# Patient Record
Sex: MALE | HISPANIC OR LATINO
[De-identification: names, ages, dates, MRNs, and addresses within clinical notes are randomized per-mention and may not be internally consistent; named-entity substitution may affect disease eponyms.]

---

## 2021-09-17 PROBLEM — Z00.00 ENCOUNTER FOR PREVENTIVE HEALTH EXAMINATION: Status: ACTIVE | Noted: 2021-09-17

## 2021-11-11 ENCOUNTER — APPOINTMENT (OUTPATIENT)
Dept: OTOLARYNGOLOGY | Facility: CLINIC | Age: 46
End: 2021-11-11

## 2021-11-11 ENCOUNTER — APPOINTMENT (OUTPATIENT)
Dept: OTOLARYNGOLOGY | Facility: CLINIC | Age: 46
End: 2021-11-11
Payer: COMMERCIAL

## 2021-11-11 VITALS
DIASTOLIC BLOOD PRESSURE: 105 MMHG | TEMPERATURE: 98.6 F | HEART RATE: 75 BPM | HEIGHT: 65 IN | BODY MASS INDEX: 29.32 KG/M2 | WEIGHT: 176 LBS | SYSTOLIC BLOOD PRESSURE: 170 MMHG

## 2021-11-11 DIAGNOSIS — M26.609 UNSPECIFIED TEMPOROMANDIBULAR JOINT DISORDER: ICD-10-CM

## 2021-11-11 DIAGNOSIS — Z86.79 PERSONAL HISTORY OF OTHER DISEASES OF THE CIRCULATORY SYSTEM: ICD-10-CM

## 2021-11-11 DIAGNOSIS — Z78.9 OTHER SPECIFIED HEALTH STATUS: ICD-10-CM

## 2021-11-11 DIAGNOSIS — Z83.3 FAMILY HISTORY OF DIABETES MELLITUS: ICD-10-CM

## 2021-11-11 DIAGNOSIS — Z86.39 PERSONAL HISTORY OF OTHER ENDOCRINE, NUTRITIONAL AND METABOLIC DISEASE: ICD-10-CM

## 2021-11-11 DIAGNOSIS — Z87.2 PERSONAL HISTORY OF DISEASES OF THE SKIN AND SUBCUTANEOUS TISSUE: ICD-10-CM

## 2021-11-11 DIAGNOSIS — Z82.49 FAMILY HISTORY OF ISCHEMIC HEART DISEASE AND OTHER DISEASES OF THE CIRCULATORY SYSTEM: ICD-10-CM

## 2021-11-11 PROCEDURE — 92550 TYMPANOMETRY & REFLEX THRESH: CPT

## 2021-11-11 PROCEDURE — 92557 COMPREHENSIVE HEARING TEST: CPT

## 2021-11-11 PROCEDURE — 99204 OFFICE O/P NEW MOD 45 MIN: CPT

## 2021-11-11 RX ORDER — LISINOPRIL 20 MG/1
20 TABLET ORAL
Refills: 0 | Status: ACTIVE | COMMUNITY

## 2021-11-27 ENCOUNTER — TRANSCRIPTION ENCOUNTER (OUTPATIENT)
Age: 46
End: 2021-11-27

## 2021-12-01 ENCOUNTER — NON-APPOINTMENT (OUTPATIENT)
Age: 46
End: 2021-12-01

## 2021-12-01 PROBLEM — M26.609 TEMPOROMANDIBULAR JOINT DISORDER: Status: ACTIVE | Noted: 2021-12-01

## 2021-12-01 RX ORDER — METFORMIN HYDROCHLORIDE 500 MG/1
500 TABLET, COATED ORAL DAILY
Refills: 0 | Status: ACTIVE | COMMUNITY

## 2021-12-01 NOTE — HISTORY OF PRESENT ILLNESS
[de-identified] : Mr. DORMAN is a 46 year old man who was referred by Dr. Mendez for left tinnitus.\par He was accompanied by his girlfriend who contributed to history and served as .\par PMH:  DM, HTN, HLD\par PCP:  Dr. Miky Falcon\par \par He reports 2-year hx of nonpulsatile chronic intermittent tinnitus (like crickets) on left side only, along with decreased hearing, that started after exposed to very loud machinery at work.  No imbalance, ear pain or ear infections.\par No change in sx over time.\par He reports that audiogram done 4-5 months ago was "normal".\par No head trauma.  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

## 2021-12-01 NOTE — CONSULT LETTER
[Dear  ___] : Dear  [unfilled], [( Thank you for referring [unfilled] for consultation for _____ )] : Thank you for referring [unfilled] for consultation for [unfilled] [Please see my note below.] : Please see my note below. [Consult Closing:] : Thank you very much for allowing me to participate in the care of this patient.  If you have any questions, please do not hesitate to contact me. [Sincerely,] : Sincerely, [FreeTextEntry2] : Elsa Mendez MD\par [address]\[place] [address] \par   [FreeTextEntry3] : \par Lili Turner MD \par Otolaryngology, Head and Neck Surgery \par \par   [___] : [unfilled]

## 2021-12-01 NOTE — DATA REVIEWED
[de-identified] : \par AUDIOGRAM (11/11/2021)\par BILATERAL:  Moderately severe rising to moderate mixed HL, w/ air bone gaps 5-55 dB \par Tympanograms  As  bilateral   \par Word recognition 100%  bilateral \par   ,DirectAddress_Unknown,DirectAddress_Unknown

## 2021-12-01 NOTE — ASSESSMENT
[FreeTextEntry1] : Mr. Severino has LEFT nonpulsatile tinnitus and L.>R HL for about 2 years, reportedly following loud noise exposure at work.\par \par Audiogram today shows moderately severe to moderate mixed HL bilateral , with air bone gaps up to 55 dB on left and up to 45 dB on right side.  Work recognition is 100%.  (Reportedly hearing test 4-5 months ago was normal.)\par I am suspicious for otosclerosis. \par Discussed options for  improvement of hearing include amplification and middle ear surgery.  He is leaning toward hearing aids.\par --> CT temporal bone noncontrast\par \par His left tinnitus may be from the mixed Hl but more likely is his bruxism, which affects left side more.\par --> use custom  nightly\par \par Return after CT scan

## 2021-12-16 ENCOUNTER — APPOINTMENT (OUTPATIENT)
Dept: OTOLARYNGOLOGY | Facility: CLINIC | Age: 46
End: 2021-12-16
Payer: COMMERCIAL

## 2021-12-16 DIAGNOSIS — H80.93 UNSPECIFIED OTOSCLEROSIS, BILATERAL: ICD-10-CM

## 2021-12-16 DIAGNOSIS — F45.8 OTHER SOMATOFORM DISORDERS: ICD-10-CM

## 2021-12-16 DIAGNOSIS — H93.12 TINNITUS, LEFT EAR: ICD-10-CM

## 2021-12-16 DIAGNOSIS — H90.6 MIXED CONDUCTIVE AND SENSORINEURAL HEARING LOSS, BILATERAL: ICD-10-CM

## 2021-12-16 PROCEDURE — 99442: CPT

## 2021-12-16 NOTE — HISTORY OF PRESENT ILLNESS
[de-identified] : TELEPHONIC VISIT\par  Visit initiated at patient request. This audio visit is occurring during the  state of emergency due to COVID-19. Governmental regulation is restricting travel, in-person contact, recommend use of remote activities and telemedicine whenever possible. I discussed with patient the limitations of telemedicine encounters, including risks associated with the technology platform, technical difficulties, data security, and no physical exam. The patient may need further testing and workup to arrive at a diagnosis and treatment plan. We discussed this will be billed as a visit. \par Mr. DORMAN  understood and elected to proceed at 10:15 AM on Dec 16, 2021 \par Patient location:Work  in [place], [place].  Patient was the only participant.\par Physician location:  Office of Dr. Turner at [address] [place] [address] in [place], [place]\par ----------------------------------------------------------------------------------------\par ---------------------------------------------------------------------------------------- \par \par Mr. DORMAN has bilateral mixed HL and left tinnitus.\par Communication was facilitated by language line  Gu[place]ian #[id number].\par PMH:  DM, HTN, HLD\par PCP:  Dr. Miky Falcon\par \par INITIAL VISIT 11/11/2021\par He reports 2-year hx of nonpulsatile chronic intermittent tinnitus (like crickets) on left side only, along with decreased hearing, that started after exposed to very loud machinery at work.  No imbalance, ear pain or ear infections.\par No change in sx over time.\par He reports that audiogram done 4-5 months ago was "normal".\par No head trauma.  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  No otosclerosis noted)) \par \par \par

## 2021-12-16 NOTE — CONSULT LETTER
[Dear  ___] : Dear  [unfilled], [Please see my note below.] : Please see my note below. [Consult Closing:] : Thank you very much for allowing me to participate in the care of this patient.  If you have any questions, please do not hesitate to contact me. [Sincerely,] : Sincerely, [___] : [unfilled] [Courtesy Letter:] : I had the pleasure of seeing your patient, [unfilled], in my office today. [FreeTextEntry2] : Miky Falcon, \par [address]\[place] [address] \par   [FreeTextEntry3] : \par Lili Turner MD \par Otolaryngology, Head and Neck Surgery \par \par

## 2021-12-16 NOTE — ASSESSMENT
[FreeTextEntry1] : Mr. Severino has LEFT nonpulsatile tinnitus and L.>R mixed HL for about 2 years, reportedly following loud noise exposure at work.\par No otosclerosis or abnormality was found on CT scan to explain the mixed HL.  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  Audiology department will contact him within a week regarding his hearing aid coverage and limitations (specific vendor, for example), then either schedule him for hearing aid evaluation or make a referral.\par \par His left tinnitus may be from the mixed Hl but may also be from bruxism, which affects left side more.\par --> see dentist for custom  \par \par

## 2021-12-16 NOTE — DATA REVIEWED
[de-identified] : \par AUDIOGRAM (11/11/2021)\par BILATERAL:  Moderately severe rising to moderate mixed HL, w/ air bone gaps 5-55 dB \par Tympanograms  As  bilateral   \par Word recognition 100%  bilateral \par

## 2023-06-03 ENCOUNTER — APPOINTMENT (OUTPATIENT)
Dept: UROLOGY | Facility: CLINIC | Age: 48
End: 2023-06-03
Payer: MEDICAID

## 2023-06-03 VITALS
OXYGEN SATURATION: 98 % | BODY MASS INDEX: 29.66 KG/M2 | WEIGHT: 178 LBS | TEMPERATURE: 98.4 F | HEART RATE: 96 BPM | HEIGHT: 65 IN | SYSTOLIC BLOOD PRESSURE: 157 MMHG | DIASTOLIC BLOOD PRESSURE: 83 MMHG

## 2023-06-03 LAB
BILIRUB UR QL STRIP: NORMAL
CLARITY UR: CLEAR
COLLECTION METHOD: NORMAL
GLUCOSE UR-MCNC: NORMAL
HCG UR QL: 0.2 EU/DL
HGB UR QL STRIP.AUTO: NORMAL
KETONES UR-MCNC: NORMAL
LEUKOCYTE ESTERASE UR QL STRIP: NORMAL
NITRITE UR QL STRIP: NORMAL
PH UR STRIP: 5.5
PROT UR STRIP-MCNC: NORMAL
SP GR UR STRIP: 1.02

## 2023-06-03 PROCEDURE — 99204 OFFICE O/P NEW MOD 45 MIN: CPT

## 2023-06-05 NOTE — HISTORY OF PRESENT ILLNESS
[FreeTextEntry1] : Language: English\par Date of First visit: 6/3/2023\par Accompanied by: Self\par Contact info: ***\par [facility]\par fax: [phone]\par \par \par CC/ Problem List:\par \par ===============================================================================\par FIRST VISIT:\par The patient is a 47 year male who first presents 06/03/2023 for ED. \par \par He is having some stress. He has been having problems intermittently but he thinks it started around January 2023. He states his erections don't last long enough but do get hard initially. He does get morning erections: he has morning erections almost every day. He does get masturbatory erections.\par \par He is able to sleep. He does not smoke. He denies h/o SCD, CP. He has DMII since 2022 and he states it is controlled. He denies h/o RP. He has never tried any meds for his erections. His erections are better when he rests well but when he is stressed he has some issues. He has no problems when he gets rest and has less stress.\par \par \par -------------------------------------------------------------------------------------------\par INTERVAL VISITS:\par \par \par ===============================================================================\par \par PMH: DMII\par PSH: Denies\par POBH: (if applicable)\par FH: \par \par ALL: NKDA\par MEDS: Metformin, Alogliptin, Vit C, B, Lisinopril\par SOC: Denies Tob, Social EtOH, No drugs\par \par \par ROS: Review of Systems is as per HPI unless otherwise denoted below\par \par \par ===============================================================================\par DATA: \par \par LABS:-------------------------------------------------------------------------------------------------------------------\par 6/3/2023: UA negative\par \par \par RADS:-------------------------------------------------------------------------------------------------------------------\par \par \par \par PATHOLOGY/CYTOLOGY:-------------------------------------------------------------------------------------------\par \par \par \par VOIDING STUDIES: ----------------------------------------------------------------------------------------------------\par 6/3/2023: PVR 3cc\par \par \par STONE STUDIES: (Analysis/LLSA)----------------------------------------------------------------------------------\par \par \par \par PROCEDURES: -----------------------------------------------------------------------------------------------\par \par \par \par \par ===============================================================================\par \par PHYSICAL EXAM:\par \par GEN: AAOx3, NAD; Habitus: OW\par \par BARRIERS to CARE: Language\par \par PSYCH: Appropriate Behavior, Affect Congruent\par \par HEENT: AT/NC Trachea midline. EOMI.\par \par Lungs: No labored breathing.\par \par NEURO: + Movement, all 4 extremities grossly intact without deficits. No tremors.\par \par SKIN: Warm dry. No visible rashes or ulcers\par \par GAIT: Gait Normal, Stability good\par \par \par =======================================================================================\par \par \par \par \par ASSESSMENT and PLAN\par \par The patient is a 47 year male with a history of the following:\par \par 1. Very Mild ED\par The patient seems to have ED when he does not get enough rest or is stressed. He has normal erections otherwise and gets morning erections.\par I told him I would be concerned about using a PDE5i in his case and he wanted to see if there was anything "natural". I told him the best thing he can try is stress reduction and rest. He understood. I also urged him to control his DM since this is his biggest risk factor for ED. He understood and agreed. His questions were answered.\par \par -----------------------------------------------------------------------------------------------------\par LABS/TESTS Ordered:\par Meds Ordered:\par Follow up: PRN\par -----------------------------------------------------------------------------------------------------\par \[facility]  [id number] used for this visit. \par \par The patient has the following barriers to care / social determinants of health which made this visit substantially longer than normal: Language\par \par The total amount of time I have personally spent preparing for this visit, reviewing the patient's test results, obtaining external history, ordering tests/medications, documenting clinical information, communicating with and counseling the patient/family and/or caregiver(s), and spent face to face with the patient explaining the above was  45 minutes.\par \par Thank you for allowing me to assist in the care of your patient. Should you have any questions please do not hesitate to reach out to me.\par \par \par Laisha Weber MD\[facility] \[facility] Department of Urology\[facility] Phone: [phone]\[facility] Fax: [phone]\[facility] \[facility] [address] [facility] [address]\[facility]

## 2023-08-26 ENCOUNTER — NON-APPOINTMENT (OUTPATIENT)
Age: 48
End: 2023-08-26

## 2023-09-07 ENCOUNTER — NON-APPOINTMENT (OUTPATIENT)
Age: 48
End: 2023-09-07

## 2023-09-07 ENCOUNTER — APPOINTMENT (OUTPATIENT)
Dept: ENDOCRINOLOGY | Facility: CLINIC | Age: 48
End: 2023-09-07
Payer: MEDICAID

## 2023-09-07 VITALS
HEART RATE: 74 BPM | WEIGHT: 174 LBS | SYSTOLIC BLOOD PRESSURE: 162 MMHG | DIASTOLIC BLOOD PRESSURE: 99 MMHG | BODY MASS INDEX: 28.96 KG/M2

## 2023-09-07 LAB — GLUCOSE BLDC GLUCOMTR-MCNC: 124

## 2023-09-07 PROCEDURE — 99205 OFFICE O/P NEW HI 60 MIN: CPT

## 2023-09-08 NOTE — HISTORY OF PRESENT ILLNESS
[FreeTextEntry1] : 46 y/o M w/ Hx of DM2, HTN, HLD here for initial evaluation and management of diabetes generally feels well and endorses no acute complaints. reports diagnosis ~ 8 y/a, has always been on pills, reports fair control over the years, denies micro vasc damage. no past insulin use. recent A1Cs have risen. admits to sedentary lifestyle and worsening dietary indiscretions, although he has noted an improvement since ozempic was started, now at 1 mg. also on metformin max dose. he otherwise denies any f/c, CP, SOB, palpitations, tremors, depressed mood, anxiety, palpitations, n/v, stool/urinary abn, skin/weight changes, heat/cold intolerance, HAs, breast/nipple changes, polyuria/polydipsia/nocturia or other complaints.

## 2023-09-08 NOTE — ASSESSMENT
[Long Term Vascular Complications] : long term vascular complications of diabetes [Carbohydrate Consistent Diet] : carbohydrate consistent diet [Importance of Diet and Exercise] : importance of diet and exercise to improve glycemic control, achieve weight loss and improve cardiovascular health [Weight Loss] : weight loss [FreeTextEntry1] : 1) DM2: controlled, A1C  target of <7%. Natural hx of the disease and importance of treatment targets discussed at length, he verbalized understanding. ADA diet and importance of exercise discussed at length. Plan is to increase metformin to full dose and increase GLP-1 agonist today(ozempic_). Refer to Nutritionist today. We oneil check microalbumin, lipids and labs on the NV. Discuss vaccines and podiatry/opthalmology referrals on NV (eye exam referral provided today.  2) Weight gain:  complicated by DM2. Discussed medical  strategies. Pt would like to try lifestyle modifications and GLP-1 agonist therapy at this time. Reassess on the NV for at least ~5% TBW loss.  3) Essential HTN: Pt is not at goal BP and on an ACE inh. Reassess microalbumin prior to the NV. start HCTZ   4) Dyslipidemia: Pt is not on a moderate intensity statin. Start Atorvastatin 40 mg QDaily. REassess lipids on the next visit. LDL target <100.

## 2023-09-15 LAB
ALBUMIN SERPL ELPH-MCNC: 4.8 G/DL
ALP BLD-CCNC: 64 U/L
ALT SERPL-CCNC: 54 U/L
ANION GAP SERPL CALC-SCNC: 15 MMOL/L
AST SERPL-CCNC: 32 U/L
BILIRUB SERPL-MCNC: 0.3 MG/DL
BUN SERPL-MCNC: 13 MG/DL
CALCIUM SERPL-MCNC: 10.1 MG/DL
CHLORIDE SERPL-SCNC: 101 MMOL/L
CHOLEST SERPL-MCNC: 174 MG/DL
CO2 SERPL-SCNC: 23 MMOL/L
CREAT SERPL-MCNC: 0.84 MG/DL
CREAT SPEC-SCNC: 124 MG/DL
EGFR: 110 ML/MIN/1.73M2
ESTIMATED AVERAGE GLUCOSE: 154 MG/DL
FOLATE SERPL-MCNC: 7.6 NG/ML
GLUCOSE SERPL-MCNC: 120 MG/DL
HBA1C MFR BLD HPLC: 7 %
HDLC SERPL-MCNC: 50 MG/DL
LDLC SERPL CALC-MCNC: 105 MG/DL
MICROALBUMIN 24H UR DL<=1MG/L-MCNC: 1.8 MG/DL
MICROALBUMIN/CREAT 24H UR-RTO: 15 MG/G
NONHDLC SERPL-MCNC: 123 MG/DL
POTASSIUM SERPL-SCNC: 4.8 MMOL/L
PROT SERPL-MCNC: 7.1 G/DL
SODIUM SERPL-SCNC: 140 MMOL/L
T4 FREE SERPL-MCNC: 1.2 NG/DL
TRIGL SERPL-MCNC: 103 MG/DL
TSH SERPL-ACNC: 0.75 UIU/ML
VIT B12 SERPL-MCNC: 343 PG/ML

## 2023-12-22 ENCOUNTER — APPOINTMENT (OUTPATIENT)
Dept: ENDOCRINOLOGY | Facility: CLINIC | Age: 48
End: 2023-12-22
Payer: MEDICAID

## 2023-12-22 VITALS
HEART RATE: 72 BPM | SYSTOLIC BLOOD PRESSURE: 134 MMHG | BODY MASS INDEX: 28.62 KG/M2 | DIASTOLIC BLOOD PRESSURE: 85 MMHG | WEIGHT: 172 LBS

## 2023-12-22 DIAGNOSIS — I10 ESSENTIAL (PRIMARY) HYPERTENSION: ICD-10-CM

## 2023-12-22 DIAGNOSIS — E66.9 OBESITY, UNSPECIFIED: ICD-10-CM

## 2023-12-22 DIAGNOSIS — E78.5 HYPERLIPIDEMIA, UNSPECIFIED: ICD-10-CM

## 2023-12-22 DIAGNOSIS — E11.9 TYPE 2 DIABETES MELLITUS W/OUT COMPLICATIONS: ICD-10-CM

## 2023-12-22 LAB
GLUCOSE BLDC GLUCOMTR-MCNC: 100
HBA1C MFR BLD HPLC: 7.7

## 2023-12-22 PROCEDURE — 82962 GLUCOSE BLOOD TEST: CPT

## 2023-12-22 PROCEDURE — 83036 HEMOGLOBIN GLYCOSYLATED A1C: CPT | Mod: QW

## 2023-12-22 PROCEDURE — 99214 OFFICE O/P EST MOD 30 MIN: CPT | Mod: 25

## 2023-12-29 PROBLEM — E11.9 CONTROLLED TYPE 2 DIABETES MELLITUS WITHOUT COMPLICATION, WITHOUT LONG-TERM CURRENT USE OF INSULIN: Status: ACTIVE | Noted: 2023-09-07

## 2023-12-29 PROBLEM — E78.5 DYSLIPIDEMIA: Status: ACTIVE | Noted: 2023-09-07

## 2023-12-29 PROBLEM — E66.9 CLASS 1 OBESITY: Status: ACTIVE | Noted: 2023-09-07

## 2023-12-29 PROBLEM — I10 BENIGN ESSENTIAL HTN: Status: ACTIVE | Noted: 2023-09-07

## 2023-12-29 NOTE — HISTORY OF PRESENT ILLNESS
[FreeTextEntry1] : 49 y/o M w/ Hx of DM2, HTN, HLD here for initial evaluation and management of diabetes generally feels well and endorses no acute complaints. reports diagnosis ~ 8 y/a, has always been on pills, reports fair control over the years, denies micro vasc damage. no past insulin use. recent A1Cs have risen. admits to sedentary lifestyle and worsening dietary indiscretions, although he has noted an improvement since ozempic was started, now at 1 mg. also on metformin max dose.   12/2023 Here for /fu, generally feels well and endorses no acute complaints. No interval events since LV. Today reports compliance and adequate intake of meds as instructed. he otherwise denies any f/c, CP, SOB, palpitations, tremors, depressed mood, anxiety, palpitations, n/v, stool/urinary abn, skin/weight changes, heat/cold intolerance, HAs, breast/nipple changes, polyuria/polydipsia/nocturia or other complaints.

## 2024-05-29 RX ORDER — SEMAGLUTIDE 2.68 MG/ML
8 INJECTION, SOLUTION SUBCUTANEOUS
Qty: 3 | Refills: 1 | Status: ACTIVE | COMMUNITY
Start: 2023-09-07 | End: 1900-01-01

## 2024-05-29 RX ORDER — ATORVASTATIN CALCIUM 40 MG/1
40 TABLET, FILM COATED ORAL
Qty: 90 | Refills: 1 | Status: ACTIVE | COMMUNITY
Start: 1900-01-01 | End: 1900-01-01

## 2024-05-29 RX ORDER — LISINOPRIL 20 MG/1
20 TABLET ORAL DAILY
Qty: 90 | Refills: 1 | Status: ACTIVE | COMMUNITY
Start: 2023-09-07 | End: 1900-01-01

## 2024-05-29 RX ORDER — METFORMIN HYDROCHLORIDE 1000 MG/1
1000 TABLET, COATED ORAL TWICE DAILY
Qty: 180 | Refills: 1 | Status: ACTIVE | COMMUNITY
Start: 2023-09-07 | End: 1900-01-01

## 2024-05-29 RX ORDER — HYDROCHLOROTHIAZIDE 12.5 MG/1
12.5 TABLET ORAL DAILY
Qty: 90 | Refills: 1 | Status: ACTIVE | COMMUNITY
Start: 2023-09-07 | End: 1900-01-01

## 2024-06-17 ENCOUNTER — TRANSCRIPTION ENCOUNTER (OUTPATIENT)
Age: 49
End: 2024-06-17

## 2024-07-23 ENCOUNTER — APPOINTMENT (OUTPATIENT)
Dept: ENDOCRINOLOGY | Facility: CLINIC | Age: 49
End: 2024-07-23
Payer: MEDICAID

## 2024-07-23 VITALS
BODY MASS INDEX: 28.29 KG/M2 | DIASTOLIC BLOOD PRESSURE: 94 MMHG | WEIGHT: 170 LBS | SYSTOLIC BLOOD PRESSURE: 139 MMHG | HEART RATE: 75 BPM

## 2024-07-23 DIAGNOSIS — E11.65 TYPE 2 DIABETES MELLITUS WITH HYPERGLYCEMIA: ICD-10-CM

## 2024-07-23 DIAGNOSIS — E66.9 OBESITY, UNSPECIFIED: ICD-10-CM

## 2024-07-23 DIAGNOSIS — I10 ESSENTIAL (PRIMARY) HYPERTENSION: ICD-10-CM

## 2024-07-23 DIAGNOSIS — E78.5 HYPERLIPIDEMIA, UNSPECIFIED: ICD-10-CM

## 2024-07-23 LAB
GLUCOSE BLDC GLUCOMTR-MCNC: 149
HBA1C MFR BLD HPLC: 7.5

## 2024-07-23 PROCEDURE — G2211 COMPLEX E/M VISIT ADD ON: CPT | Mod: NC

## 2024-07-23 PROCEDURE — 83036 HEMOGLOBIN GLYCOSYLATED A1C: CPT | Mod: QW

## 2024-07-23 PROCEDURE — 99214 OFFICE O/P EST MOD 30 MIN: CPT | Mod: 25

## 2024-07-23 PROCEDURE — 82962 GLUCOSE BLOOD TEST: CPT

## 2024-07-23 NOTE — ASSESSMENT
[Long Term Vascular Complications] : long term vascular complications of diabetes [Carbohydrate Consistent Diet] : carbohydrate consistent diet [Importance of Diet and Exercise] : importance of diet and exercise to improve glycemic control, achieve weight loss and improve cardiovascular health [Weight Loss] : weight loss [FreeTextEntry1] : 1) DM2: uncontrolled, A1C target of <7%, now at 7/5% as of 7/2024 Natural hx of the disease and importance of treatment targets discussed at length, he verbalized understanding. ADA diet and importance of exercise discussed at length. Plan is to continue metformin full dose and switch GLP-1 agonist today(has failed ozempic max dose, switch to mounjaro 10 mg). Refer to Nutritionist today. We oneil check microalbumin, lipids and labs on the NV.   2) Weight gain: complicated by DM2. Discussed medical strategies. Pt would like to try lifestyle modifications and GLP-1 agonist therapy at this time. Reassess on the NV for at least ~5% TBW loss.  3) Essential HTN: Pt is not at goal BP and on an ACE inh. Reassess microalbumin prior to the NV. start HCTZ  4) Dyslipidemia: Pt is not on a moderate intensity statin. Start Atorvastatin 40 mg QDaily. REassess lipids on the next visit. LDL target <100.

## 2024-07-23 NOTE — HISTORY OF PRESENT ILLNESS
[FreeTextEntry1] : 47 y/o M w/ Hx of DM2, HTN, HLD here for initial evaluation and management of diabetes generally feels well and endorses no acute complaints. reports diagnosis ~ 8 y/a, has always been on pills, reports fair control over the years, denies micro vasc damage. no past insulin use. recent A1Cs have risen. admits to sedentary lifestyle and worsening dietary indiscretions, although he has noted an improvement since ozempic was started, now at 1 mg. also on metformin max dose.   7/2024 Here for /fu, generally feels well and endorses no acute complaints. No interval events since LV. Today reports compliance and adequate intake of meds as instructed. he reports appetite is not controlled. FSG in AM ~ 150, post prandial as high as 200. he otherwise denies any f/c, CP, SOB, palpitations, tremors, depressed mood, anxiety, palpitations, n/v, stool/urinary abn, skin/weight changes, heat/cold intolerance, HAs, breast/nipple changes, polyuria/polydipsia/nocturia or other complaints.

## 2024-07-26 RX ORDER — TIRZEPATIDE 10 MG/.5ML
10 INJECTION, SOLUTION SUBCUTANEOUS
Qty: 3 | Refills: 1 | Status: ACTIVE | COMMUNITY
Start: 2024-07-23 | End: 1900-01-01

## 2025-02-06 ENCOUNTER — TRANSCRIPTION ENCOUNTER (OUTPATIENT)
Age: 50
End: 2025-02-06

## 2025-05-01 NOTE — PHYSICAL EXAM
Operative Note      Patient: Gus Xiong  YOB: 1959  MRN: [id number]  Emily Luna MD      Date of Procedure: 5/1/2025    Pre-Op Diagnosis Codes:      * Screen for colon cancer [Z12.11]. Family history colon cancer. History adenomatous polyp(s)    Post-Op Diagnosis:  no polyps. Mild sigmoid diverticulosis. Very small mildly thrombosed hemorrhoid asymptomatic       Procedure:    Colonoscopy to cecum                   [Midline] : trachea located in midline position [FreeTextEntry1] : No hoarseness.  [Normal] : no neck adenopathy [de-identified] : Carotid pulses 2+ bilateral.

## 2025-07-30 ENCOUNTER — NON-APPOINTMENT (OUTPATIENT)
Age: 50
End: 2025-07-30

## 2025-08-01 ENCOUNTER — APPOINTMENT (OUTPATIENT)
Dept: ENDOCRINOLOGY | Facility: CLINIC | Age: 50
End: 2025-08-01

## 2025-08-01 VITALS
HEART RATE: 71 BPM | SYSTOLIC BLOOD PRESSURE: 142 MMHG | WEIGHT: 171 LBS | DIASTOLIC BLOOD PRESSURE: 85 MMHG | BODY MASS INDEX: 28.49 KG/M2 | HEIGHT: 65 IN

## 2025-08-01 DIAGNOSIS — I10 ESSENTIAL (PRIMARY) HYPERTENSION: ICD-10-CM

## 2025-08-01 DIAGNOSIS — E66.811 OBESITY, CLASS 1: ICD-10-CM

## 2025-08-01 DIAGNOSIS — E11.9 TYPE 2 DIABETES MELLITUS W/OUT COMPLICATIONS: ICD-10-CM

## 2025-08-01 DIAGNOSIS — E78.5 HYPERLIPIDEMIA, UNSPECIFIED: ICD-10-CM

## 2025-08-01 LAB
GLUCOSE BLDC GLUCOMTR-MCNC: 136
HBA1C MFR BLD HPLC: 7.2

## 2025-08-01 PROCEDURE — 99215 OFFICE O/P EST HI 40 MIN: CPT | Mod: 25

## 2025-08-01 RX ORDER — TIRZEPATIDE 12.5 MG/.5ML
12.5 INJECTION, SOLUTION SUBCUTANEOUS
Qty: 3 | Refills: 2 | Status: ACTIVE | COMMUNITY
Start: 2025-08-01 | End: 1900-01-01

## 2025-08-07 LAB
ALBUMIN SERPL ELPH-MCNC: 4.7 G/DL
ALBUMIN, RANDOM URINE: <1.2 MG/DL
ALP BLD-CCNC: 82 U/L
ALT SERPL-CCNC: 58 U/L
ANION GAP SERPL CALC-SCNC: 15 MMOL/L
AST SERPL-CCNC: 33 U/L
BILIRUB SERPL-MCNC: 0.5 MG/DL
BUN SERPL-MCNC: 13 MG/DL
CALCIUM SERPL-MCNC: 10.3 MG/DL
CHLORIDE SERPL-SCNC: 99 MMOL/L
CHOLEST SERPL-MCNC: 121 MG/DL
CO2 SERPL-SCNC: 26 MMOL/L
CREAT SERPL-MCNC: 0.9 MG/DL
CREAT SPEC-SCNC: 152 MG/DL
EGFRCR SERPLBLD CKD-EPI 2021: 106 ML/MIN/1.73M2
FOLATE SERPL-MCNC: 10.7 NG/ML
GLUCOSE SERPL-MCNC: 122 MG/DL
HDLC SERPL-MCNC: 42 MG/DL
LDLC SERPL-MCNC: 58 MG/DL
MICROALBUMIN/CREAT 24H UR-RTO: NORMAL MG/G
NONHDLC SERPL-MCNC: 79 MG/DL
POTASSIUM SERPL-SCNC: 4.4 MMOL/L
PROT SERPL-MCNC: 7.6 G/DL
SODIUM SERPL-SCNC: 140 MMOL/L
T4 FREE SERPL-MCNC: 1.3 NG/DL
TRIGL SERPL-MCNC: 112 MG/DL
TSH SERPL-ACNC: 1.01 UIU/ML
VIT B12 SERPL-MCNC: 373 PG/ML

## 2025-08-15 ENCOUNTER — TRANSCRIPTION ENCOUNTER (OUTPATIENT)
Age: 50
End: 2025-08-15